# Patient Record
Sex: FEMALE | Race: WHITE | NOT HISPANIC OR LATINO | URBAN - METROPOLITAN AREA
[De-identification: names, ages, dates, MRNs, and addresses within clinical notes are randomized per-mention and may not be internally consistent; named-entity substitution may affect disease eponyms.]

---

## 2017-12-05 ENCOUNTER — TELEPHONE (OUTPATIENT)
Dept: OTOLARYNGOLOGY | Facility: CLINIC | Age: 67
End: 2017-12-05

## 2017-12-05 NOTE — TELEPHONE ENCOUNTER
----- Message from Stephanie Marshall sent at 12/5/2017 11:11 AM CST -----  Contact: pt  States she would like to be seen today for an ear infection. Please call pt at [phone]. Thank you

## 2017-12-05 NOTE — TELEPHONE ENCOUNTER
Spoke with patient and informed her that we did not have any openings today but could schedule her as early as 8:30 tomorrow morning at [facility]. Patient states she will probably go to urgent care due to pain but will call the clinic back if she changes her mind and wants the appt.

## 2017-12-05 NOTE — TELEPHONE ENCOUNTER
Asuncion spoke with patient but documented under a separate phone message.  Below is a copy of her message.      Asuncion Garcia LPN     12/5/17 11:29 AM   Note      Spoke with patient and informed her that we did not have any openings today but could schedule her as early as 8:30 tomorrow morning at [facility]. Patient states she will probably go to urgent care due to pain but will call the clinic back if she changes her mind and wants the appt.

## 2024-10-03 ENCOUNTER — LAB VISIT (OUTPATIENT)
Dept: LAB | Facility: HOSPITAL | Age: 74
End: 2024-10-03
Attending: ANESTHESIOLOGY
Payer: MEDICARE

## 2024-10-03 DIAGNOSIS — G47.33 OBSTRUCTIVE SLEEP APNEA SYNDROME: ICD-10-CM

## 2024-10-03 LAB
ANION GAP SERPL CALC-SCNC: 8 MMOL/L (ref 8–16)
BUN SERPL-MCNC: 21 MG/DL (ref 8–23)
CALCIUM SERPL-MCNC: 9.4 MG/DL (ref 8.7–10.5)
CHLORIDE SERPL-SCNC: 106 MMOL/L (ref 95–110)
CO2 SERPL-SCNC: 27 MMOL/L (ref 23–29)
CREAT SERPL-MCNC: 0.9 MG/DL (ref 0.5–1.4)
EST. GFR  (NO RACE VARIABLE): >60 ML/MIN/1.73 M^2
GLUCOSE SERPL-MCNC: 93 MG/DL (ref 70–110)
POTASSIUM SERPL-SCNC: 4.4 MMOL/L (ref 3.5–5.1)
SODIUM SERPL-SCNC: 141 MMOL/L (ref 136–145)

## 2024-10-03 PROCEDURE — 80048 BASIC METABOLIC PNL TOTAL CA: CPT | Mod: 91 | Performed by: ANESTHESIOLOGY

## 2024-10-03 PROCEDURE — 36415 COLL VENOUS BLD VENIPUNCTURE: CPT | Mod: PO | Performed by: ANESTHESIOLOGY

## 2024-10-07 PROBLEM — G47.33 OBSTRUCTIVE SLEEP APNEA SYNDROME: Status: ACTIVE | Noted: 2024-10-07

## 2024-10-07 PROBLEM — E66.3 OVERWEIGHT WITH BODY MASS INDEX (BMI) OF 27 TO 27.9 IN ADULT: Status: ACTIVE | Noted: 2024-10-07
